# Patient Record
Sex: MALE | Race: WHITE | URBAN - METROPOLITAN AREA
[De-identification: names, ages, dates, MRNs, and addresses within clinical notes are randomized per-mention and may not be internally consistent; named-entity substitution may affect disease eponyms.]

---

## 2017-07-26 ENCOUNTER — HISTORICAL (OUTPATIENT)
Dept: ADMINISTRATIVE | Facility: HOSPITAL | Age: 67
End: 2017-07-26

## 2017-07-26 LAB
ABS NEUT (OLG): 7.09 X10(3)/MCL (ref 2.1–9.2)
ALBUMIN SERPL-MCNC: 4 GM/DL (ref 3.4–5)
ALBUMIN/GLOB SERPL: 1.2 RATIO (ref 1.1–2)
ALP SERPL-CCNC: 96 UNIT/L (ref 50–136)
ALT SERPL-CCNC: 44 UNIT/L (ref 12–78)
APPEARANCE, UA: CLEAR
APTT PPP: 38.2 SECOND(S) (ref 20.6–36)
AST SERPL-CCNC: 27 UNIT/L (ref 15–37)
BACTERIA SPEC CULT: NORMAL /HPF
BASOPHILS # BLD AUTO: 0 X10(3)/MCL (ref 0–0.2)
BASOPHILS NFR BLD AUTO: 0 %
BILIRUB SERPL-MCNC: 0.5 MG/DL (ref 0.2–1)
BILIRUB UR QL STRIP: NEGATIVE
BILIRUBIN DIRECT+TOT PNL SERPL-MCNC: 0.1 MG/DL (ref 0–0.5)
BILIRUBIN DIRECT+TOT PNL SERPL-MCNC: 0.4 MG/DL (ref 0–0.8)
BUN SERPL-MCNC: 31 MG/DL (ref 7–18)
CALCIUM SERPL-MCNC: 9.8 MG/DL (ref 8.5–10.1)
CHLORIDE SERPL-SCNC: 113 MMOL/L (ref 98–107)
CO2 SERPL-SCNC: 27 MMOL/L (ref 21–32)
COLOR UR: YELLOW
CREAT SERPL-MCNC: 1.51 MG/DL (ref 0.7–1.3)
EOSINOPHIL # BLD AUTO: 0.2 X10(3)/MCL (ref 0–0.9)
EOSINOPHIL NFR BLD AUTO: 2 %
ERYTHROCYTE [DISTWIDTH] IN BLOOD BY AUTOMATED COUNT: 13.4 % (ref 11.5–17)
GLOBULIN SER-MCNC: 3.2 GM/DL (ref 2.4–3.5)
GLUCOSE (UA): NEGATIVE
GLUCOSE SERPL-MCNC: 91 MG/DL (ref 74–106)
HCT VFR BLD AUTO: 44.2 % (ref 42–52)
HGB BLD-MCNC: 14.4 GM/DL (ref 14–18)
HGB UR QL STRIP: NEGATIVE
INR PPP: 1.1 (ref 0–1.27)
KETONES UR QL STRIP: NEGATIVE
LEUKOCYTE ESTERASE UR QL STRIP: NEGATIVE
LYMPHOCYTES # BLD AUTO: 1.1 X10(3)/MCL (ref 0.6–4.6)
LYMPHOCYTES NFR BLD AUTO: 12 %
MCH RBC QN AUTO: 31 PG (ref 27–31)
MCHC RBC AUTO-ENTMCNC: 32.6 GM/DL (ref 33–36)
MCV RBC AUTO: 95.1 FL (ref 80–94)
MONOCYTES # BLD AUTO: 0.8 X10(3)/MCL (ref 0.1–1.3)
MONOCYTES NFR BLD AUTO: 9 %
NEUTROPHILS # BLD AUTO: 7.09 X10(3)/MCL (ref 2.1–9.2)
NEUTROPHILS NFR BLD AUTO: 76 %
NITRITE UR QL STRIP: NEGATIVE
PH UR STRIP: 6 [PH] (ref 5–9)
PLATELET # BLD AUTO: 180 X10(3)/MCL (ref 130–400)
PMV BLD AUTO: 10.1 FL (ref 9.4–12.4)
POTASSIUM SERPL-SCNC: 4.4 MMOL/L (ref 3.5–5.1)
PROT SERPL-MCNC: 7.2 GM/DL (ref 6.4–8.2)
PROT UR QL STRIP: NEGATIVE
PROTHROMBIN TIME: 14 SECOND(S) (ref 12.1–14.2)
RBC # BLD AUTO: 4.65 X10(6)/MCL (ref 4.7–6.1)
RBC #/AREA URNS HPF: NORMAL /[HPF]
SODIUM SERPL-SCNC: 147 MMOL/L (ref 136–145)
SP GR UR STRIP: 1.01 (ref 1–1.03)
SQUAMOUS EPITHELIAL, UA: NORMAL
TRANSFERRIN SERPL-MCNC: 239 MG/DL (ref 200–360)
UROBILINOGEN UR STRIP-ACNC: 0.2
WBC # SPEC AUTO: 9.3 X10(3)/MCL (ref 4.5–11.5)
WBC #/AREA URNS HPF: NORMAL /HPF

## 2017-07-28 LAB — FINAL CULTURE: NORMAL

## 2017-09-06 ENCOUNTER — HISTORICAL (OUTPATIENT)
Dept: ADMINISTRATIVE | Facility: HOSPITAL | Age: 67
End: 2017-09-06

## 2018-08-29 ENCOUNTER — HISTORICAL (OUTPATIENT)
Dept: ADMINISTRATIVE | Facility: HOSPITAL | Age: 68
End: 2018-08-29

## 2019-10-24 ENCOUNTER — HISTORICAL (OUTPATIENT)
Dept: LAB | Facility: HOSPITAL | Age: 69
End: 2019-10-24

## 2019-10-24 LAB
ABS NEUT (OLG): 7.3 X10(3)/MCL (ref 1.5–6.9)
ALBUMIN SERPL-MCNC: 3.4 GM/DL (ref 3.4–5)
ALBUMIN/GLOB SERPL: 0.8 RATIO
ALP SERPL-CCNC: 113 UNIT/L (ref 30–113)
ALT SERPL-CCNC: 25 UNIT/L (ref 10–45)
APPEARANCE, UA: CLEAR
AST SERPL-CCNC: 50 UNIT/L (ref 15–37)
BILIRUB SERPL-MCNC: 0.3 MG/DL (ref 0.1–0.9)
BILIRUB UR QL STRIP: NEGATIVE
BILIRUBIN DIRECT+TOT PNL SERPL-MCNC: 0.1 MG/DL (ref 0–0.3)
BILIRUBIN DIRECT+TOT PNL SERPL-MCNC: 0.2 MG/DL
BUN SERPL-MCNC: 27 MG/DL (ref 10–20)
CALCIUM SERPL-MCNC: 10 MG/DL (ref 8–10.5)
CHLORIDE SERPL-SCNC: 107 MMOL/L (ref 100–108)
CO2 SERPL-SCNC: 28 MMOL/L (ref 21–35)
COLOR UR: NORMAL
CREAT SERPL-MCNC: 1.78 MG/DL (ref 0.7–1.3)
ERYTHROCYTE [DISTWIDTH] IN BLOOD BY AUTOMATED COUNT: 12.2 % (ref 11.5–17)
GLOBULIN SER-MCNC: 4.2 GM/DL
GLUCOSE (UA): NEGATIVE
GLUCOSE SERPL-MCNC: 99 MG/DL (ref 75–116)
HCT VFR BLD AUTO: 41.6 % (ref 42–52)
HGB BLD-MCNC: 13.5 GM/DL (ref 14–18)
HGB UR QL STRIP: NEGATIVE
KETONES UR QL STRIP: NEGATIVE
LACTATE SERPL-SCNC: 1.4 MMOL/L (ref 0.4–2)
LEUKOCYTE ESTERASE UR QL STRIP: NEGATIVE
LITHIUM SERPL-MCNC: 0.8 MMOL/L (ref 0.6–1.2)
MAGNESIUM SERPL-MCNC: 2.7 MG/DL (ref 1.8–2.4)
MCH RBC QN AUTO: 32 PG (ref 27–34)
MCHC RBC AUTO-ENTMCNC: 32 GM/DL (ref 31–36)
MCV RBC AUTO: 97 FL (ref 80–99)
NITRITE UR QL STRIP: NEGATIVE
PH UR STRIP: 7 [PH]
PLATELET # BLD AUTO: 221 X10(3)/MCL (ref 140–400)
PMV BLD AUTO: 9.3 FL (ref 6.8–10)
POTASSIUM SERPL-SCNC: 4.3 MMOL/L (ref 3.6–5.2)
PROT SERPL-MCNC: 7.6 GM/DL (ref 6.4–8.2)
PROT UR QL STRIP: NEGATIVE
RBC # BLD AUTO: 4.28 X10(6)/MCL (ref 4.7–6.1)
SODIUM SERPL-SCNC: 142 MMOL/L (ref 135–145)
SP GR UR STRIP: <=1.005
UROBILINOGEN UR STRIP-ACNC: 0.2 EU/DL
WBC # SPEC AUTO: 9.6 X10(3)/MCL (ref 4.5–11.5)

## 2020-01-17 LAB
ABS NEUT (OLG): 5.2 X10(3)/MCL (ref 1.5–6.9)
ALBUMIN SERPL-MCNC: 3.9 GM/DL (ref 3.4–5)
APTT PPP: 28.6 SECOND(S) (ref 25–35)
BASOPHILS # BLD AUTO: 0 X10(3)/MCL (ref 0–0.1)
BASOPHILS NFR BLD AUTO: 0 % (ref 0–1)
BUN SERPL-MCNC: 23 MG/DL (ref 10–20)
CALCIUM SERPL-MCNC: 10.5 MG/DL (ref 8–10.5)
CHLORIDE SERPL-SCNC: 108 MMOL/L (ref 100–108)
CO2 SERPL-SCNC: 25 MMOL/L (ref 21–35)
CREAT SERPL-MCNC: 1.28 MG/DL (ref 0.7–1.3)
EOSINOPHIL # BLD AUTO: 0.2 X10(3)/MCL (ref 0–0.6)
EOSINOPHIL NFR BLD AUTO: 3 % (ref 0–5)
ERYTHROCYTE [DISTWIDTH] IN BLOOD BY AUTOMATED COUNT: 13.1 % (ref 11.5–17)
GLUCOSE SERPL-MCNC: 111 MG/DL (ref 75–116)
HCT VFR BLD AUTO: 41.6 % (ref 42–52)
HGB BLD-MCNC: 13.7 GM/DL (ref 14–18)
IMM GRANULOCYTES # BLD AUTO: 0.02 10*3/UL (ref 0–0.02)
IMM GRANULOCYTES NFR BLD AUTO: 0.3 % (ref 0–0.43)
INR PPP: 1 (ref 0–1.2)
LYMPHOCYTES # BLD AUTO: 1.2 X10(3)/MCL (ref 0.5–4.1)
LYMPHOCYTES NFR BLD AUTO: 17 % (ref 15–40)
MCH RBC QN AUTO: 32 PG (ref 27–34)
MCHC RBC AUTO-ENTMCNC: 33 GM/DL (ref 31–36)
MCV RBC AUTO: 96 FL (ref 80–99)
MONOCYTES # BLD AUTO: 0.6 X10(3)/MCL (ref 0–1.1)
MONOCYTES NFR BLD AUTO: 9 % (ref 4–12)
NEUTROPHILS # BLD AUTO: 5.2 X10(3)/MCL (ref 1.5–6.9)
NEUTROPHILS NFR BLD AUTO: 71 % (ref 43–75)
PHOSPHATE SERPL-MCNC: 3.7 MG/DL (ref 2.6–4.7)
PLATELET # BLD AUTO: 192 X10(3)/MCL (ref 140–400)
PMV BLD AUTO: 10 FL (ref 6.8–10)
POTASSIUM SERPL-SCNC: 3.8 MMOL/L (ref 3.6–5.2)
PROTHROMBIN TIME: 10.6 SECOND(S) (ref 9–12)
RBC # BLD AUTO: 4.32 X10(6)/MCL (ref 4.7–6.1)
SODIUM SERPL-SCNC: 143 MMOL/L (ref 135–145)
WBC # SPEC AUTO: 7.4 X10(3)/MCL (ref 4.5–11.5)

## 2020-01-20 ENCOUNTER — HISTORICAL (OUTPATIENT)
Dept: ANESTHESIOLOGY | Facility: HOSPITAL | Age: 70
End: 2020-01-20

## 2020-02-03 ENCOUNTER — HISTORICAL (OUTPATIENT)
Dept: ANESTHESIOLOGY | Facility: HOSPITAL | Age: 70
End: 2020-02-03

## 2020-02-17 ENCOUNTER — HISTORICAL (OUTPATIENT)
Dept: ADMINISTRATIVE | Facility: HOSPITAL | Age: 70
End: 2020-02-17

## 2020-02-18 ENCOUNTER — HISTORICAL (OUTPATIENT)
Dept: ADMINISTRATIVE | Facility: HOSPITAL | Age: 70
End: 2020-02-18

## 2022-04-10 ENCOUNTER — HISTORICAL (OUTPATIENT)
Dept: ADMINISTRATIVE | Facility: HOSPITAL | Age: 72
End: 2022-04-10

## 2022-04-29 VITALS
SYSTOLIC BLOOD PRESSURE: 120 MMHG | WEIGHT: 163.13 LBS | DIASTOLIC BLOOD PRESSURE: 72 MMHG | HEIGHT: 67 IN | SYSTOLIC BLOOD PRESSURE: 120 MMHG | BODY MASS INDEX: 24.72 KG/M2 | BODY MASS INDEX: 24.72 KG/M2 | DIASTOLIC BLOOD PRESSURE: 76 MMHG | WEIGHT: 165 LBS | HEIGHT: 68 IN | SYSTOLIC BLOOD PRESSURE: 121 MMHG | WEIGHT: 163.13 LBS | DIASTOLIC BLOOD PRESSURE: 83 MMHG | HEIGHT: 68 IN | BODY MASS INDEX: 25.9 KG/M2

## 2022-05-03 NOTE — HISTORICAL [facility]
This is a historical note converted from [employer]. Formatting and pictures may have been removed.  Please reference [facility] for original formatting and attached multimedia. Chief Complaint  Patient here for a 1 year follow up of his left TKR done on 08/17/17. patient states he does not have any pain but only soreness  History of Present Illness  Doing well with no pain or tenderness 1 year postop from left total knee arthroplasty. ?Patient states it feels like a normal knee.  Review of Systems  Systemic: No fever, no chills, and no recent weight change.  Head: No headache - frequent.  Eyes: No vision problems.  Otolarnygeal: No hearing loss, no earache, no epistaxis, no hoarseness, and no tooth pain. Gums normal.  Cardiovascular: No chest pain or discomfort and no palpitations.  Pulmonary: No pulmonary symptoms - difficulty sleeping, no dyspnea, and cough not worse in the morning.  Gastrointestinal: Appetite not decreased. No dysphagia and no constant eructation. No nausea, no vomiting, no abdominal pain, no hematochezia, and no loose/mushy stools - frequent. No constipation - frequent.  Genitourinary: No genitourinary symptoms - Getting up every night to urinate and no increase in urinary frequency. No urinary hesitancy. No urinary loss of control - difficulty stopping urination and no burning sensation during urination.  Musculoskeletal: No calf muscle cramps and no localized soft tissue swelling of the ankle.  Neurological: No fainting and no convulsions.  Psychological: Not feeling nervous tension, not feeling nervous from exhaustion, and no depression.  Skin: No rash. Previous history of no ulcers.  Physical Exam  Vitals & Measurements  BP:?120/72?  HT:?170?cm? HT:?170?cm? WT:?74.84?kg? WT:?74.84?kg? BMI:?25.9?  ?  Cardiovascular:  Arterial Pulses: ? Dorsalis pedis pulses were normal.  Musculoskeletal System:  Left Knee:  General: ? No swelling of the knee. ? No warmth of the knee. ? No pain was elicited by motion  of the knee. ? No instability of the knee. ? Knees demonstrated no muscle weakness.  Left Knee: ? Motion was normal.  Active flexion _130 degrees  Active extension 0 degrees  Neurological:  Sensation: ? Monofilament wire test of the leg/foot was normal.  Motor (Strength): ? No weakness of the left knee was observed.  Skin:  ? No cellulitis. Surgical incision well healed ?  Tests  Imaging:  X-Ray Knee:  A complete knee x-ray with standing views was performed -of left knee.  Impressions Radiology Test  X-ray of knee was performed intact left knee implant.? Pristine interfaces and stable well aligned left total knee arthroplasty.  Assessment/Plan  1.?History of total left knee replacement  Ordered:  Office/Outpatient Visit Level 3 Established [id number] , History of total left knee replacement, [facility], 08/29/18 9:30:00 CDT  ?  Orders:  Clinic Follow-up PRN, 08/29/18 9:30:00 CDT, Future Order, [facility]   Problem List/Past Medical History  Ongoing  Bipolar 1 disorder  Cervical disc disease  History of total left knee replacement  Hypothyroidism  Parkinsons disease  Primary osteoarthritis of left knee  Historical  No qualifying data  Procedure/Surgical History  JEANINE ORELLANA Total Knee Arthroplasty (Left) (08/17/2017)  Replacement of Left Knee Joint with Synthetic Substitute, Cemented, Open Approach (08/17/2017)  Robotic Assisted Procedure of Lower Extremity, Open Approach (08/17/2017)  Appendectomy  Arthroscopic left knee operation  LEFT WRIST SCAR TISSUE   Medications  AZILECT 1 MG TABLET, 1 mg= 1 tab(s), Oral, Daily  Centrum Silver oral tablet, 1 tab(s), Oral, Daily  Colace 100 mg oral capsule, 200 mg= 2 cap(s), Oral, Daily,? ?Not taking  Ecotrin 325 mg oral enteric coated tablet, 325 mg= 1 tab(s), Oral, Daily,? ?Not taking  Fish Oil oral capsule, 1 cap(s), Oral, Daily,? ?Not taking  Gingko Biloba, 1 tab(s), Oral, BID  glucosamine 500 mg oral tablet, 500 mg= 1 tab(s), Oral, TID  LAMOTRIGINE 200 MG  TABLET, 200 mg= 1 tab(s), Oral, BID  Levothyroxine 200 Mcg Tablet, 200 mcg= 1 tab(s), Oral, Daily,? ?Not taking  Lithium Carb Er 450mg Tab, 450 mg= 1 tab(s), Oral, Daily  mirtazapine 15 mg oral tablet, 15 mg= 1 tab(s), Oral, Once a day (at bedtime),? ?Not taking  niacin 500 mg oral capsule, 500 mg= 1 cap(s), Oral, BID  OMEPRAZOLE DR 40 MG CAPSULE, 40 mg= 1 cap(s), Oral, Daily  ondansetron 4 mg oral tablet, 4 mg= 1 tab(s), Oral, q8hr, PRN,? ?Not taking  OXYBUTYNIN 5 MG TABLET, 5 mg= 1 tab(s), Oral, BID  Ranitidine 150 Mg Tablet, 150 mg= 1 tab(s), Oral, qPM  Ropinirole Hcl Er 12 Mg Tablet, 12 mg= 1 tab(s), Oral, Daily,? ?Not taking  Rytary 23.75 mg-95 mg oral capsule, extended release, 1 cap(s), Oral, 5x/Day,? ?Not taking  RYTARY ER 36.25 MG-145 MG CAP, 1 cap(s), Oral, TID  Simvastatin 40 Mg Tablet, 40 mg= 1 tab(s), Oral, qPM  Tamsulosin Hcl 0.4 Mg Capsule, 0.4 mg= 1 cap(s), Oral, BID,? ?Not taking  Vitamin B12, 1 tab(s), Oral, Daily  Allergies  penicillin?(Rash)  Social History  Alcohol  Never, 07/05/2017  Employment/School  Employed, 07/05/2017  Home/Environment  Lives with Alone., 07/05/2017  Substance Abuse  Never, 07/05/2017  Tobacco  Never smoker, 07/05/2017  Family History  Breast: Mother.  Health Maintenance  Health Maintenance  ???Pending?(in the next year)  ??? ??Due?  ??? ? ? ?Aspirin Therapy for CVD Prevention due??08/21/18??and every 1??year(s)  ??? ? ? ?Abdominal Aortic Aneurysm Screening due??08/29/18??and every 100??year(s)  ??? ? ? ?Alcohol Misuse Screening due??08/29/18??and every 1??year(s)  ??? ? ? ?Colorectal Screening (Senior Wellness) due??08/29/18??and every?  ??? ? ? ?Fall Risk Assessment due??08/29/18??and every 1??year(s)  ??? ? ? ?Functional Assessment due??08/29/18??and every 1??year(s)  ??? ? ? ?Pneumococcal Vaccine due??08/29/18??and every 100??year(s)  ??? ? ? ?Pneumococcal Vaccine due??08/29/18??and every?  ??? ? ? ?Tetanus Vaccine due??08/29/18??and every 10??year(s)  ??? ? ? ?Zoster  Vaccine due??08/29/18??and every 100??year(s)  ???Satisfied?(in the past 1 year)  ??? ??Satisfied?  ??? ? ? ?Blood Pressure Screening on??08/29/18.??Satisfied by Kacy Rick MA  ??? ? ? ?Body Mass Index Check on??08/29/18.??Satisfied by Kacy Rick MA  ??? ? ? ?Depression Screening on??08/29/18.??Satisfied by Kacy Rick MA  ??? ? ? ?Obesity Screening on??08/29/18.??Satisfied by Kacy Rick MA  ?  ?

## 2022-05-03 NOTE — HISTORICAL [facility]
This is a historical note converted from [employer]. Formatting and pictures may have been removed.  Please reference Morgan for original formatting and attached multimedia. Chief Complaint  S/P LEFT TKA ON 8/18/17. DOING GREAT WITH P.T. AND WALKING.  History of Present Illness  2 weeks status post left total knee arthroplasty  He is doing great, minimal complaints of left knee pain and swelling today  He is currently ambulating without?an assistive device?and attending physical therapy  Review of Systems  Systemic: No fever, no chills, and no recent weight change.  Head: No headache - frequent.  Eyes: No vision problems.  Otolarnygeal: No hearing loss, no earache, no epistaxis, no hoarseness, and no tooth pain. Gums normal.  Cardiovascular: No chest pain or discomfort and no palpitations.  Pulmonary: No pulmonary symptoms - difficulty sleeping, no dyspnea, and cough not worse in the morning.  Gastrointestinal: Appetite not decreased. No dysphagia and no constant eructation. No nausea, no vomiting, no abdominal pain, no hematochezia, and no loose/mushy stools - frequent. No constipation - frequent.  Genitourinary: No genitourinary symptoms - Getting up every night to urinate and no increase in urinary frequency. No urinary hesitancy. No urinary loss of control - difficulty stopping urination and no burning sensation during urination.  Musculoskeletal: No calf muscle cramps and no localized soft tissue swelling of the ankle.  Neurological: No fainting and no convulsions.  Psychological: Not feeling nervous tension, not feeling nervous from exhaustion, and no depression.  Skin: No rash. Previous history of no ulcers.  ?  ?  Physical Exam  Vitals & Measurements  BP:?120/76?  HT:?172.72?cm? HT:?172.72?cm? WT:?74.0?kg? WT:?74.0?kg? BMI:?24.81?  Musculoskeletal System:  Left Knee:  General/bilateral:???Swelling of the knee.???Knee demonstrated muscle weakness.???No warmth of the knee.???No pain was elicited by motion of the  knee.???No instability of the knee  Left Knee:  Knee Motion: Value  Active flexion 110 degrees  Active extension 0 degrees  ?   Neurological:  Motor (Strength):???Weakness of the Left knee was observed.  Skin:  ??No cellulitis.  Wound healing normal. staples C/D/I.  ?  ?   TESTS  Imaging:  X-Ray Knee:  AP and lateral view x-rays of the Left knee with sunrise view of the patella were performed  ?   IMPRESSIONS RADIOLOGY TEST  X-ray of knee was performed intact Left knee implant.  ?  ?  ?  ?  Assessment/Plan  Status post total left knee replacement  ? Staples removed today, Steri-Strips applied.?Hell continue with physical therapy and follow-up in 3 months for repeat evaluation.?Dr. Almanzar has examined the patient and agrees with plan  Ordered:  Clinic Follow up  Post-Op follow-up visit [id number] PC  PT/OT External Referral  ?   Problem List/Past Medical History  Ongoing  Bipolar 1 disorder  Cervical disc disease  Hypothyroidism  Parkinsons disease  Primary osteoarthritis of left knee  Historical  Procedure/Surgical History  JEANINE ORELLANA Total Knee Arthroplasty (Left) (08/17/2017)  Replacement of Left Knee Joint with Synthetic Substitute, Cemented, Open Approach (08/17/2017)  Robotic Assisted Procedure of Lower Extremity, Open Approach (08/17/2017)  Appendectomy  Arthroscopic knee operation  LEFT WRIST SCAR TISSUE  Medications  AZILECT 1 MG TABLET, 1 mg= 1 tab(s), Oral, Daily  Centrum Silver oral tablet, 1 tab(s), Oral, Daily  Colace 100 mg oral capsule, 200 mg= 2 cap(s), Oral, Daily  Ecotrin 325 mg oral enteric coated tablet, 325 mg= 1 tab(s), Oral, Daily  Fish Oil oral capsule, 1 cap(s), Oral, Daily  Gingko Biloba, 1 tab(s), Oral, BID  glucosamine 500 mg oral tablet, 500 mg= 1 tab(s), Oral, TID  LAMOTRIGINE 200 MG TABLET, 200 mg= 1 tab(s), Oral, BID  Levothyroxine 200 Mcg Tablet, 200 mcg= 1 tab(s), Oral, Daily  Lithium Carb Er 450mg Tab, 450 mg= 1 tab(s), Oral, Daily  mirtazapine 15 mg oral tablet, 15 mg= 1 tab(s), Oral,  Once a day (at bedtime)  niacin 500 mg oral capsule, 500 mg= 1 cap(s), Oral, BID  ondansetron 4 mg oral tablet, 4 mg= 1 tab(s), Oral, q8hr, PRN  Percocet 5/325 oral tablet, 1-2 tab(s), Oral, q4hr, PRN  Ropinirole Hcl Er 12 Mg Tablet, 12 mg= 1 tab(s), Oral, Daily  Simvastatin 40 Mg Tablet, 40 mg= 1 tab(s), Oral, qPM  Tamsulosin Hcl 0.4 Mg Capsule, 0.4 mg= 1 cap(s), Oral, BID  Vitamin B12, 1 tab(s), Oral, Daily  Allergies  penicillin?(Rash)  Social History  Alcohol - 07/05/2017  Never  Employment/School - 07/05/2017  Employed  Home/Environment - 07/05/2017  Lives with Alone.  Substance Abuse - 07/05/2017  Never  Tobacco - 07/05/2017  Never smoker  Family History  Breast: Mother.

## 2022-05-03 NOTE — HISTORICAL [facility]
This is a historical note converted from [employer]. Formatting and pictures may have been removed.  Please reference [facility] for original formatting and attached multimedia. Preoperative Diagnosis  Nuclear sclerotic cataract left eye  Postoperative Diagnosis  Same  Operation  Phacoemulsification [left] eye with posterior chamber intraocular lens implantation with OMIDRIA?  After the appropriate informed consent was obtained the patient was brought to the holding area. Anesthetic and dilating drops were applied. ?The patient was then brought to the operating suite and placed in supine position for ophthalmic surgery. The area around the operative eye was then sterilely prepped and draped in the usual ophthalmic fashion. A lid speculum was placed in the conjunctival fornices of the eye. A 4-0 silk bridle suture was passed through the superior rectus muscle. The conjunctiva was taken down with a Benson scissors. The bipolar cautery was used to provide adequate hemostasis. A paracentesis was then made at the 2 oclock position and Omidria was injected through the paracentesis. A #69 blade was used to fashion a scleral groove approximately 2 mm posterior to the surgical limbus. This groove measured 2 mm in length. The #66 blade was then used to bevel the incision anterior to the clear cornea. The anterior chamber was entered with a phacotome blade through the scleral groove. ?Amvisc plus was introduced into the eye and a continuous circular capsular axis was performed with a bent cystitome needle. BSS on the cannula was then used to hydrodissect and hydrodelineate the knee lens nucleus. The lens nucleus was then removed by the divide and conquer technique with the phacoemulsification handpiece. After the nuclear material was removed, the irrigation and aspiration handpiece was introduced into the eye and the residual cortical material was removed. More Amvisc plus was introduced into the eye and into the capsular bag. ?A 3  piece folded posterior chamber intraocular lens was then inserted through the scleral groove and into the capsular bag. The residual viscoelastic was removed with the irrigation and aspiration handpiece. BSS was then instilled through the paracentesis site and the wound was checked and found to be watertight. The 4-0 silk bridle suture was removed and the conjunctiva was reapproximated with bipolar cautery. One drop of Timoptic XE 0.5%, 1 drop of 2% pilocarpine and Maxitrol ointment were then placed in the eye at the termination of the surgery. The patient tolerated the procedure well without complications and was returned to outpatient surgery.  Surgeon(s)  Maik Lockett MD  Anesthesia  Topical with MAC

## 2022-05-03 NOTE — HISTORICAL [facility]
This is a historical note converted from [employer]. Formatting and pictures may have been removed.  Please reference [employer] for original formatting and attached multimedia. Preoperative Diagnosis  Nuclear sclerotic cataract right eye  Postoperative Diagnosis  Same  Operation  Phacoemulsification left eye with posterior chamber intraocular lens implantation.?  After the appropriate informed consent was obtained the patient was brought to the holding area. Anesthetic and dilating drops were applied. ?The patient was then brought to the operating suite and placed in supine position for ophthalmic surgery. The area around the operative eye was then sterilely prepped and draped in the usual ophthalmic fashion. A lid speculum was placed in the conjunctival fornices of the eye. A 4-0 silk bridle suture was passed through the superior rectus muscle. The conjunctiva was taken down with a Benson scissors. The bipolar cautery was used to provide adequate hemostasis. A paracentesis was then made at the 2 oclock position and 1% non-preserved Xylocaine was injected through the paracentesis. Approximately 0.3 mL of this solution was injected. A #69 blade was used to fashion a scleral groove approximately 2 mm posterior to the surgical limbus. This groove measured 2 mm in length. The #66 blade was then used to bevel the incision anterior to the clear cornea. The anterior chamber was entered with a phacotome blade through the scleral groove. ?Amvisc plus was introduced into the eye and a continuous circular capsular axis was performed with a bent cystitome needle. BSS on the cannula was then used to hydrodissect and hydrodelineate the knee lens nucleus. The lens nucleus was then removed by the divide and conquer technique with the phacoemulsification handpiece. After the nuclear material was removed, the irrigation and aspiration handpiece was introduced into the eye and the residual cortical material was removed. More Amvisc plus was  introduced into the eye and into the capsular bag. ?A 3 piece folded posterior chamber intraocular lens was then inserted through the scleral groove and into the capsular bag. The residual viscoelastic was removed with the irrigation and aspiration handpiece. BSS was then instilled through the paracentesis site and the wound was checked and found to be watertight. The 4-0 silk bridle suture was removed and the conjunctiva was reapproximated with bipolar cautery. One drop of Timoptic XE 0.5%, 1 drop of 2% pilocarpine and Maxitrol ointment were then placed in the eye at the termination of the surgery.? The patient tolerated the procedure well without complications and was returned to outpatient surgery.  Surgeon(s)  Levy ESCAMILLA  Anesthesia  With MAC